# Patient Record
Sex: MALE | Race: ASIAN | Employment: FULL TIME | URBAN - METROPOLITAN AREA
[De-identification: names, ages, dates, MRNs, and addresses within clinical notes are randomized per-mention and may not be internally consistent; named-entity substitution may affect disease eponyms.]

---

## 2021-08-02 ENCOUNTER — OFFICE VISIT (OUTPATIENT)
Dept: ORTHOPEDICS | Facility: CLINIC | Age: 31
End: 2021-08-02
Payer: COMMERCIAL

## 2021-08-02 ENCOUNTER — ANCILLARY PROCEDURE (OUTPATIENT)
Dept: GENERAL RADIOLOGY | Facility: CLINIC | Age: 31
End: 2021-08-02
Attending: PEDIATRICS
Payer: COMMERCIAL

## 2021-08-02 VITALS
HEIGHT: 75 IN | BODY MASS INDEX: 30.84 KG/M2 | DIASTOLIC BLOOD PRESSURE: 75 MMHG | SYSTOLIC BLOOD PRESSURE: 110 MMHG | WEIGHT: 248 LBS

## 2021-08-02 DIAGNOSIS — M25.561 RIGHT KNEE PAIN: ICD-10-CM

## 2021-08-02 DIAGNOSIS — S89.91XA INJURY OF RIGHT KNEE, INITIAL ENCOUNTER: Primary | ICD-10-CM

## 2021-08-02 PROCEDURE — 99204 OFFICE O/P NEW MOD 45 MIN: CPT | Performed by: PEDIATRICS

## 2021-08-02 PROCEDURE — 73562 X-RAY EXAM OF KNEE 3: CPT | Performed by: RADIOLOGY

## 2021-08-02 RX ORDER — ALBUTEROL SULFATE 1.25 MG/3ML
1.25 SOLUTION RESPIRATORY (INHALATION) EVERY 6 HOURS PRN
COMMUNITY

## 2021-08-02 ASSESSMENT — MIFFLIN-ST. JEOR: SCORE: 2170.55

## 2021-08-02 NOTE — PROGRESS NOTES
ASSESSMENT & PLAN    Shay was seen today for pain.    Diagnoses and all orders for this visit:    Injury of right knee, initial encounter  -     XR Knee Stand AP Bilat [place] Bilat Lat Rt; Future  -     MR Knee Right w/o Contrast; Future      This issue is acute and Unchanged.    We discussed these other possible diagnosis: meniscal injury, MCL, ACL  Will obtain MRI, discussed supportive care in the interim.    Plan:  - Today's Plan of Care:  MRI of the Right Knee - Call [phone] to schedule MRI  Continue with relative rest and activity modification, Ice, Compression, and Elevation.  Can apply ice 10-15 minutes 3-4 times per day as needed. OTC medications as needed.  Crutches if needed  Home Exercise Program - range of motion, quad sets, straight leg raises  Work Letter    -We also discussed other future treatment options:  Referral to Physical Therapy  Referral to Orthopedic Surgery    Follow Up: In clinic with Dr. Romero after MRI (wait at least 1-2 days)    Concerning signs and symptoms were reviewed.  The patient expressed understanding of this management plan and all questions were answered at this time.    Nuha Romero MD [facility]  Sports Medicine Physician  [facility]      -----  Chief Complaint   Patient presents with     Right Knee - Pain       SUBJECTIVE  Shay ANTON Do is a/an 31 year old male who is seen as a self referral for evaluation of right knee.  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 Most of his pain is in the medial aspect of the knee. Patient reports a lot of stiffness and it feels like it is locking.      Note: please provide workability note    The patient is seen by themselves.    Onset: 1 day(s) ago. Patient describes injury as planting and turning  Location of Pain: right knee, primarily medial aspect of the knee  Worsened by: any side to side, any twisting, anything other than straight leg   Better  "with: resting, icing  Treatments tried: rest/activity avoidance, elevation and ice  Associated symptoms: swelling, weakness of knee and low leg, locking or catching and feeling of instability    Orthopedic/Surgical history: NO  Social History/Occupation:     No family history pertinent to patient's problem today.    REVIEW OF SYSTEMS:  Review of Systems  Skin: no bruising, no swelling  Musculoskeletal: as above  Neurologic: no numbness, paresthesias  Remainder of review of systems is negative including constitutional, CV, pulmonary, GI, except as noted in HPI or medical history.    OBJECTIVE:  /75   Ht 1.905 m (6' 3\")   Wt 112.5 kg (248 lb)   BMI 31.00 kg/m     General: healthy, alert and in no distress  HEENT: no scleral icterus or conjunctival erythema  Skin: no suspicious lesions or rash. No jaundice.  CV: distal perfusion intact  Resp: normal respiratory effort without conversational dyspnea   Psych: normal mood and affect  Gait: antalgic  Neuro: Normal light sensory exam of lower extremity    Right Knee exam  Inspection:      mild effusion right    Patella:      Mobility -       hypomobile right    Tender:      medial joint line right    Non Tender:      remainder of knee area bilateral    Knee ROM:      Flexion 90 degrees right       Extension 5 degrees right    Strength:      5-/5 with knee extension right    Special Tests:     neg (-) Alba right       neg (-) varus at 0 deg and 30 deg right       Pain without opening valgus right    Gait:      normal    Neurovascular:      2+ peripheral pulses bilaterally and brisk capillary refill       sensation grossly intact    RADIOLOGY:  I independently ordered, visualized and reviewed these images with the patient  AP and sunrise bilateral and right lateral XR views of knees reviewed: no acute bony abnormality, no significant degenerative change  - will follow official read    Review of the result(s) of each unique test - XR       "

## 2021-08-02 NOTE — LETTER
August 2, 2021      Shay ANTON Do  [address]  [place] [address]        To Whom It May Concern,      Shay is under my care for an injury.  He may return to work with the following restrictions:  - Light duty only, desk work, allow crutches and brace as needed.    Follow up will be in 2-3 weeks.        Sincerely,        Nuha Romero MD

## 2021-08-02 NOTE — LETTER
8/2/2021         RE: Shay ANTON Do  [address]  [place] [address]        Dear Colleague,    Thank you for referring your patient, Shay ANTON Do, to the [facility]. Please see a copy of my visit note below.    ASSESSMENT & PLAN    Shay was seen today for pain.    Diagnoses and all orders for this visit:    Injury of right knee, initial encounter  -     XR Knee Stand AP Bilat [place] Bilat Lat Rt; Future  -     MR Knee Right w/o Contrast; Future      This issue is acute and Unchanged.    We discussed these other possible diagnosis: meniscal injury, MCL, ACL  Will obtain MRI, discussed supportive care in the interim.    Plan:  - Today's Plan of Care:  MRI of the Right Knee - Call [phone] to schedule MRI  Continue with relative rest and activity modification, Ice, Compression, and Elevation.  Can apply ice 10-15 minutes 3-4 times per day as needed. OTC medications as needed.  Crutches if needed  Home Exercise Program - range of motion, quad sets, straight leg raises  Work Letter    -We also discussed other future treatment options:  Referral to Physical Therapy  Referral to Orthopedic Surgery    Follow Up: In clinic with Dr. Romero after MRI (wait at least 1-2 days)    Concerning signs and symptoms were reviewed.  The patient expressed understanding of this management plan and all questions were answered at this time.    Nuha Romero MD [facility]  Sports Medicine Physician  [facility]      -----  Chief Complaint   Patient presents with     Right Knee - Pain       SUBJECTIVE  Shay ANTON Do is a/an 31 year old male who is seen as a self referral for evaluation of right knee.  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 Most of his pain is in the medial aspect of the knee. Patient reports a lot of stiffness and it feels like it is locking.      Note: please provide workability note    The  "patient is seen by themselves.    Onset: 1 day(s) ago. Patient describes injury as planting and turning  Location of Pain: right knee, primarily medial aspect of the knee  Worsened by: any side to side, any twisting, anything other than straight leg   Better with: resting, icing  Treatments tried: rest/activity avoidance, elevation and ice  Associated symptoms: swelling, weakness of knee and low leg, locking or catching and feeling of instability    Orthopedic/Surgical history: NO  Social History/Occupation:     No family history pertinent to patient's problem today.    REVIEW OF SYSTEMS:  Review of Systems  Skin: no bruising, no swelling  Musculoskeletal: as above  Neurologic: no numbness, paresthesias  Remainder of review of systems is negative including constitutional, CV, pulmonary, GI, except as noted in HPI or medical history.    OBJECTIVE:  /75   Ht 1.905 m (6' 3\")   Wt 112.5 kg (248 lb)   BMI 31.00 kg/m     General: healthy, alert and in no distress  HEENT: no scleral icterus or conjunctival erythema  Skin: no suspicious lesions or rash. No jaundice.  CV: distal perfusion intact  Resp: normal respiratory effort without conversational dyspnea   Psych: normal mood and affect  Gait: antalgic  Neuro: Normal light sensory exam of lower extremity    Right Knee exam  Inspection:      mild effusion right    Patella:      Mobility -       hypomobile right    Tender:      medial joint line right    Non Tender:      remainder of knee area bilateral    Knee ROM:      Flexion 90 degrees right       Extension 5 degrees right    Strength:      5-/5 with knee extension right    Special Tests:     neg (-) Alba right       neg (-) varus at 0 deg and 30 deg right       Pain without opening valgus right    Gait:      normal    Neurovascular:      2+ peripheral pulses bilaterally and brisk capillary refill       sensation grossly intact    RADIOLOGY:  I independently ordered, visualized and reviewed these " images with the patient  AP and sunrise bilateral and right lateral XR views of knees reviewed: no acute bony abnormality, no significant degenerative change  - will follow official read    Review of the result(s) of each unique test - XR           Again, thank you for allowing me to participate in the care of your patient.        Sincerely,        Nuha Romero MD

## 2021-08-02 NOTE — PATIENT INSTRUCTIONS
We discussed these other possible diagnosis: meniscal injury, MCL, ACL    Plan:  - Today's Plan of Care:  MRI of the Right Knee - Call [phone] to schedule MRI  Continue with relative rest and activity modification, Ice, Compression, and Elevation.  Can apply ice 10-15 minutes 3-4 times per day as needed. OTC medications as needed.  Crutches if needed  Home Exercise Program - range of motion, quad sets, straight leg raises  Work Letter    -We also discussed other future treatment options:  Referral to Physical Therapy  Referral to Orthopedic Surgery    Follow Up: In clinic with Dr. Romero after MRI (wait at least 1-2 days)    If you have any further questions for your physician or physician s care team you can call [phone] and use option 3 to leave a voice message. Calls received during business hours will be returned same day.

## 2021-08-03 ENCOUNTER — HOSPITAL ENCOUNTER (OUTPATIENT)
Dept: MRI IMAGING | Facility: CLINIC | Age: 31
Discharge: HOME | End: 2021-08-03
Attending: PEDIATRICS | Admitting: PEDIATRICS
Payer: COMMERCIAL

## 2021-08-03 DIAGNOSIS — S89.91XA INJURY OF RIGHT KNEE, INITIAL ENCOUNTER: ICD-10-CM

## 2021-08-03 PROCEDURE — 73721 MRI JNT OF LWR EXTRE W/O DYE: CPT | Mod: RT

## 2021-08-03 PROCEDURE — 73721 MRI JNT OF LWR EXTRE W/O DYE: CPT | Mod: 26 | Performed by: RADIOLOGY

## 2021-08-04 ENCOUNTER — MYC MEDICAL ADVICE (OUTPATIENT)
Dept: ORTHOPEDICS | Facility: CLINIC | Age: 31
End: 2021-08-04

## 2021-08-04 DIAGNOSIS — S89.91XA INJURY OF RIGHT KNEE, INITIAL ENCOUNTER: Primary | ICD-10-CM

## 2021-08-04 DIAGNOSIS — S83.411D SPRAIN OF MEDIAL COLLATERAL LIGAMENT OF RIGHT KNEE, SUBSEQUENT ENCOUNTER: ICD-10-CM

## 2021-08-04 NOTE — TELEPHONE ENCOUNTER
From result note    Pascual Day,  Please see the results of your MRI.  There is a grade 2 injury of the MCL which will heal and improve with a hinged knee brace and physical therapy.  One of the ATCs will reach out to discuss results further and get a PT order placed  We will review this in more detail at your follow-up appointment 8/12/2021.  Let us know if you have questions.     Nuha Romero MD, CAQ  Primary Care Sports Medicine  [facility]

## 2021-08-04 NOTE — TELEPHONE ENCOUNTER
Spoke with patient on the phone. He had no further questions regarding the MRI results. He is willing to do PT and an BRITTANY order was placed. He thinks he has a hinged knee brace at home already otherwise he will return to the clinic to obtain one at his next office appointment.    John Bueno ATC, LAT

## 2021-08-04 NOTE — TELEPHONE ENCOUNTER
----- Message from Nuha Romero MD sent at 8/4/2021  9:35 AM CDT -----  Reviewed and notified of results via [employer].    Please call patient to review these results further.  Crunching likely related to altered mechanics and swelling from the injury.    He made need help obtaining hinged knee brace.  Please place official PT referral so he can schedule.    Nuha Romero MD  [facility],  Please see the results of your MRI.  There is a grade 2 injury of the MCL which will heal and improve with a hinged knee brace and physical therapy.  One of the ATCs will reach out to discuss results further and get a PT order placed  We will review this in more detail at your follow-up appointment 8/12/2021.  Let us know if you have questions.    Nuha Romero MD, CAQ  Primary Care Sports Medicine  [facility]

## 2021-08-04 NOTE — TELEPHONE ENCOUNTER
Please advise on MRI results from yesterday and questions in MyChart regarding crepitus with knee motion.    MR right knee without contrast 8/3/2021 11:01 AM     Techniques: Multiplanar multisequence imaging of the right knee was  obtained without administration of intra-articular or intravenous  contrast using routing protocol.     History: Injury of right knee, initial encounter     Comparison: None available.     Findings:     MENISCI:  Medial meniscus: Intact.  Lateral meniscus: Intact.     LIGAMENTS  Cruciate ligaments: Intact.  Medial supporting structures: High-grade sprain/partial tear of the  tibial collateral ligament with partial tear at the level of the joint  line particularly at the posterior oblique ligament junction.  Associated periligamentous edema extending both anteriorly and  posteriorly along the medial retinaculum as well as posterior oblique  ligament.  Lateral supporting structures: Intact.     EXTENSOR MECHANISM  Intact.     FLUID  No joint effusion. No substantial Baker's cyst.     OSSEOUS and ARTICULAR STRUCTURES  Bones: No fracture, contusion, or osseous lesion is seen.     Patellofemoral compartment: No high-grade hyaline cartilage disease.     Medial compartment: No high-grade hyaline cartilage disease.     Lateral compartment: No high-grade hyaline cartilage disease.     ANCILLARY FINDINGS  None.                                                                      Impression:  1. Grade 2 injury of the tibial collateral ligament (superficial layer  of MCL).     RHIANNA BOLAÑOS

## 2021-08-12 ENCOUNTER — OFFICE VISIT (OUTPATIENT)
Dept: ORTHOPEDICS | Facility: CLINIC | Age: 31
End: 2021-08-12
Payer: COMMERCIAL

## 2021-08-12 VITALS
SYSTOLIC BLOOD PRESSURE: 100 MMHG | DIASTOLIC BLOOD PRESSURE: 70 MMHG | BODY MASS INDEX: 30.84 KG/M2 | WEIGHT: 248 LBS | HEIGHT: 75 IN

## 2021-08-12 DIAGNOSIS — S89.91XD INJURY OF RIGHT KNEE, SUBSEQUENT ENCOUNTER: ICD-10-CM

## 2021-08-12 DIAGNOSIS — S83.411D SPRAIN OF MEDIAL COLLATERAL LIGAMENT OF RIGHT KNEE, SUBSEQUENT ENCOUNTER: Primary | ICD-10-CM

## 2021-08-12 PROCEDURE — 99213 OFFICE O/P EST LOW 20 MIN: CPT | Performed by: PEDIATRICS

## 2021-08-12 ASSESSMENT — MIFFLIN-ST. JEOR: SCORE: 2170.55

## 2021-08-12 NOTE — LETTER
8/12/2021         RE: Shay ANTON Do  [address]  [place] [address]        Dear Colleague,    Thank you for referring your patient, Shay ANTON Do, to the [facility]. Please see a copy of my visit note below.    ASSESSMENT & PLAN    Shay was seen today for follow up and pain.    Diagnoses and all orders for this visit:    Sprain of medial collateral ligament of right knee, subsequent encounter    Injury of right knee, subsequent encounter      This issue is acute and Improving.  Reviewed MRI, discussed continued supportive care, discussed the benefits of physical therapy for strengthening and return to sports. Discussed return to sport criteria, risk of repeat injury.    Plan:  - Today's Plan of Care:  Continue with relative rest and activity modification, Ice, Compression, and Elevation.  Can apply ice 10-15 minutes 3-4 times per day as needed. OTC medications as needed.    Continue knee brace for support  Continue Home Exercise Program  Start Physical Therapy    Return to Sports Criteria (anticipate 6-8 weeks)  PT can help guide return: pain free, full range of motion and full strength  - Would then recommend very gradual return to activities with rest and follow up appointment if pain or symptoms return.    Follow Up: 6 weeks    Concerning signs and symptoms were reviewed.  The patient expressed understanding of this management plan and all questions were answered at this time.    Nuha Romero MD [facility]  Sports Medicine Physician  [facility]      SUBJECTIVE- Interim History August 12, 2021    Chief Complaint   Patient presents with     Right Knee - Pain     Follow Up     MRI       Shay ANTON Do is a 31 year old male who is seen in f/u up for    Sprain of medial collateral ligament of right knee, subsequent encounter  Injury of right knee, subsequent encounter. Since last visit on 8/2/21 patient states every day is a little bit better.  He has  "less and less pain, when he is taking the brace off the pain is less.  He is able to walk faster.  - Now ~ 2 weeks from initial injury    Worsened by: any side to side, any twisting, knee flexion  Better with: resting, icing, bracing   Treatments tried: rest/activity avoidance, elevation and ice  Associated symptoms: swelling, weakness of knee and low leg     Orthopedic/Surgical history: NO  Social History/Occupation:   No family history pertinent to patient's problem today.    REVIEW OF SYSTEMS:  Review of Systems  Skin: no bruising, no swelling  Musculoskeletal: as above  Neurologic: no numbness, paresthesias  Remainder of review of systems is negative including constitutional, CV, pulmonary, GI, except as noted in HPI or medical history.    OBJECTIVE:  /70   Ht 1.905 m (6' 3\")   Wt 112.5 kg (248 lb)   BMI 31.00 kg/m       General: healthy, alert and in no distress  HEENT: no scleral icterus or conjunctival erythema  Skin: no suspicious lesions or rash. No jaundice.  CV: distal perfusion intact  Resp: normal respiratory effort without conversational dyspnea   Psych: normal mood and affect  Gait: antalgic  Neuro: Normal light sensory exam of lower extremity     Right Knee exam  Inspection:      mild swelling right     Patella:      Mobility -       hypomobile right     Tender:      medial joint line right and throughout MCL     Non Tender:      remainder of knee area bilateral     Knee ROM:      Flexion 120 degrees right       Extension 5 degrees right     Strength:      5-/5 with knee extension right     Special Tests:     neg (-) Alba right       neg (-) varus at 0 deg and 30 deg right       Pain without opening valgus right   neg (-) anterior and posterior drawer right     Gait:      normal     Neurovascular:      2+ peripheral pulses bilaterally and brisk capillary refill       sensation grossly intact    RADIOLOGY:  Final results and radiologist's interpretation, available in the Epic " health record.  Images were reviewed with the patient in the office today.  My personal interpretation of the performed imaging:  MRI right knee 8/3/2021 -  grade 2 MCL injury    Review of the result(s) of each unique test - MRI           Again, thank you for allowing me to participate in the care of your patient.        Sincerely,        Nuha Romero MD

## 2021-08-12 NOTE — PATIENT INSTRUCTIONS
Plan:  - Today's Plan of Care:  Continue with relative rest and activity modification, Ice, Compression, and Elevation.  Can apply ice 10-15 minutes 3-4 times per day as needed. OTC medications as needed.    Continue knee brace for support  Continue Home Exercise Program  Start Physical Therapy    Return to Sports Criteria (anticipate 6-8 weeks)  PT can help guide return: pain free, full range of motion and full strength  - Would then recommend very gradual return to activities with rest and follow up appointment if pain or symptoms return.    Follow Up: 6 weeks    If you have any further questions for your physician or physician s care team you can call [phone] and use option 3 to leave a voice message. Calls received during business hours will be returned same day.

## 2021-08-12 NOTE — PROGRESS NOTES
ASSESSMENT & PLAN    Shay was seen today for follow up and pain.    Diagnoses and all orders for this visit:    Sprain of medial collateral ligament of right knee, subsequent encounter    Injury of right knee, subsequent encounter      This issue is acute and Improving.  Reviewed MRI, discussed continued supportive care, discussed the benefits of physical therapy for strengthening and return to sports. Discussed return to sport criteria, risk of repeat injury.    Plan:  - Today's Plan of Care:  Continue with relative rest and activity modification, Ice, Compression, and Elevation.  Can apply ice 10-15 minutes 3-4 times per day as needed. OTC medications as needed.    Continue knee brace for support  Continue Home Exercise Program  Start Physical Therapy    Return to Sports Criteria (anticipate 6-8 weeks)  PT can help guide return: pain free, full range of motion and full strength  - Would then recommend very gradual return to activities with rest and follow up appointment if pain or symptoms return.    Follow Up: 6 weeks    Concerning signs and symptoms were reviewed.  The patient expressed understanding of this management plan and all questions were answered at this time.    Nuha Romero MD [facility]  Sports Medicine Physician  [facility]      SUBJECTIVE- Interim History August 12, 2021    Chief Complaint   Patient presents with     Right Knee - Pain     Follow Up     MRI       Shay ANTON Do is a 31 year old male who is seen in f/u up for    Sprain of medial collateral ligament of right knee, subsequent encounter  Injury of right knee, subsequent encounter. Since last visit on 8/2/21 patient states every day is a little bit better.  He has less and less pain, when he is taking the brace off the pain is less.  He is able to walk faster.  - Now ~ 2 weeks from initial injury    Worsened by: any side to side, any twisting, knee flexion  Better with: resting, icing, bracing   Treatments tried:  "rest/activity avoidance, elevation and ice  Associated symptoms: swelling, weakness of knee and low leg     Orthopedic/Surgical history: NO  Social History/Occupation:   No family history pertinent to patient's problem today.    REVIEW OF SYSTEMS:  Review of Systems  Skin: no bruising, no swelling  Musculoskeletal: as above  Neurologic: no numbness, paresthesias  Remainder of review of systems is negative including constitutional, CV, pulmonary, GI, except as noted in HPI or medical history.    OBJECTIVE:  /70   Ht 1.905 m (6' 3\")   Wt 112.5 kg (248 lb)   BMI 31.00 kg/m       General: healthy, alert and in no distress  HEENT: no scleral icterus or conjunctival erythema  Skin: no suspicious lesions or rash. No jaundice.  CV: distal perfusion intact  Resp: normal respiratory effort without conversational dyspnea   Psych: normal mood and affect  Gait: antalgic  Neuro: Normal light sensory exam of lower extremity     Right Knee exam  Inspection:      mild swelling right     Patella:      Mobility -       hypomobile right     Tender:      medial joint line right and throughout MCL     Non Tender:      remainder of knee area bilateral     Knee ROM:      Flexion 120 degrees right       Extension 5 degrees right     Strength:      5-/5 with knee extension right     Special Tests:     neg (-) Alba right       neg (-) varus at 0 deg and 30 deg right       Pain without opening valgus right   neg (-) anterior and posterior drawer right     Gait:      normal     Neurovascular:      2+ peripheral pulses bilaterally and brisk capillary refill       sensation grossly intact    RADIOLOGY:  Final results and radiologist's interpretation, available in the [facility] health record.  Images were reviewed with the patient in the office today.  My personal interpretation of the performed imaging:  MRI right knee 8/3/2021 -  grade 2 MCL injury    Review of the result(s) of each unique test - MRI       "

## 2021-08-22 ENCOUNTER — HEALTH MAINTENANCE LETTER (OUTPATIENT)
Age: 31
End: 2021-08-22

## 2021-10-17 ENCOUNTER — HEALTH MAINTENANCE LETTER (OUTPATIENT)
Age: 31
End: 2021-10-17

## 2022-10-03 ENCOUNTER — HEALTH MAINTENANCE LETTER (OUTPATIENT)
Age: 32
End: 2022-10-03

## 2024-05-18 ENCOUNTER — HEALTH MAINTENANCE LETTER (OUTPATIENT)
Age: 34
End: 2024-05-18

## 2025-06-08 ENCOUNTER — HEALTH MAINTENANCE LETTER (OUTPATIENT)
Age: 35
End: 2025-06-08